# Patient Record
Sex: MALE | Race: WHITE | NOT HISPANIC OR LATINO | Employment: FULL TIME | URBAN - METROPOLITAN AREA
[De-identification: names, ages, dates, MRNs, and addresses within clinical notes are randomized per-mention and may not be internally consistent; named-entity substitution may affect disease eponyms.]

---

## 2022-06-23 ENCOUNTER — VIRTUAL VISIT (OUTPATIENT)
Dept: FAMILY MEDICINE | Facility: CLINIC | Age: 57
End: 2022-06-23
Payer: COMMERCIAL

## 2022-06-23 ENCOUNTER — NURSE TRIAGE (OUTPATIENT)
Dept: NURSING | Facility: CLINIC | Age: 57
End: 2022-06-23

## 2022-06-23 ENCOUNTER — LAB (OUTPATIENT)
Dept: LAB | Facility: CLINIC | Age: 57
End: 2022-06-23

## 2022-06-23 DIAGNOSIS — R50.9 FEVER, UNSPECIFIED FEVER CAUSE: Primary | ICD-10-CM

## 2022-06-23 DIAGNOSIS — R50.9 FEVER, UNSPECIFIED FEVER CAUSE: ICD-10-CM

## 2022-06-23 DIAGNOSIS — R05.9 COUGH: ICD-10-CM

## 2022-06-23 DIAGNOSIS — R52 BODY ACHES: ICD-10-CM
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
## 2022-06-23 LAB — B BURGDOR IGG+IGM SER QL: 0.05

## 2022-06-23 PROCEDURE — 86618 LYME DISEASE ANTIBODY: CPT

## 2022-06-23 PROCEDURE — 99203 OFFICE O/P NEW LOW 30 MIN: CPT | Mod: 95 | Performed by: PHYSICIAN ASSISTANT

## 2022-06-23 PROCEDURE — 36415 COLL VENOUS BLD VENIPUNCTURE: CPT

## 2022-06-23 ASSESSMENT — ENCOUNTER SYMPTOMS
HEADACHES: 1
FEVER: 1

## 2022-06-23 NOTE — PROGRESS NOTES
Luis is a 57 year old who is being evaluated via a billable telephone visit.      What phone number would you like to be contacted at? [phone]  How would you like to obtain your AVS? Mail a copy    Assessment & Plan     Fever, unspecified fever cause  May use over-the-counter meds to treat his fever  - Lyme Disease Total Abs Bld with Reflex to Confirm CLIA; Future  - Symptomatic; Yes; 6/21/2022 COVID-19 Virus (Coronavirus) by PCR Nose; Future    Cough  Should have face-to-face visit if rapidly progressing  - Symptomatic; Yes; 6/21/2022 COVID-19 Virus (Coronavirus) by PCR Nose; Future    Body aches  Await results, may use over-the-counter meds as needed  - Lyme Disease Total Abs Bld with Reflex to Confirm CLIA; Future  - Symptomatic; Yes; 6/21/2022 COVID-19 Virus (Coronavirus) by PCR Nose; Future    Tick bite, unspecified site, initial encounter  For now we will test only for Lyme but may add Ehrlichia if symptoms are persisting.  If Lyme is negative could consider retesting again in 2 weeks just to be sure  - Lyme Disease Total Abs Bld with Reflex to Confirm CLIA; Future      No follow-ups on file.    Marleni Jackson PA-C  [facility] ECHO Smith is a 57 year old, presenting for the following health issues:  Fever (Headache//) and Headache (cough)      Fever  Associated symptoms include a fever and headaches.   Headache   Associated symptoms include a fever.        Acute Illness  Acute illness concerns: headache, fever, cough  Onset/Duration: 2 days  Symptoms:  Fever: YES- he felt warm they do not have a working thermometer  Chills/Sweats: YES a little bit  Headache (location?): YES- frontal aspect of head, hurts more in head with cough  Sinus Pressure: no  Conjunctivitis:  no  Ear Pain: no  Rhinorrhea: no  Congestion: no denies congestion  Sore Throat: no  Cough: YES-slight, dry cough with shortness of breath a little bit with a deep breath it makes him cough, he has no  significant dyspnea or shortness of breath he is able to do normal activities, no chest pain.  No history of lung disease he is a lifetime non-smoker  Wheeze: no  Decreased Appetite: YES  Nausea: YES  Vomiting: no  Diarrhea: no  Dysuria/Freq.: no  Dysuria or Hematuria: no  Fatigue/Achiness: YES-- more achiness he feels more tired  He did have 2 tick bites and what he believes to be 2 wood tick bites 2 weeks ago.  He thinks he may have been on him for a few hours he has been looking for the bull's-eye rash but has not seen it.  Sick/Strep Exposure: no, he has had 1 COVID-vaccine, the Definigen in June 2021, he is planning to have a booster when he is feeling well  Therapies tried and outcome: None      Review of Systems   Constitutional: Positive for fever.   Neurological: Positive for headaches.            Objective           Vitals:  No vitals were obtained today due to virtual visit.    Physical Exam   healthy, alert and no distress  PSYCH: Alert and oriented times 3; coherent speech, normal   rate and volume, able to articulate logical thoughts, able   to abstract reason, no tangential thoughts, no hallucinations   or delusions  His affect is normal and pleasant  RESP: Occasional mild cough, no audible wheezing, able to talk in full sentences  Remainder of exam unable to be completed due to telephone visits    No results found for any visits on 06/23/22.            Phone call duration: 13 minutes            .  ..

## 2022-06-23 NOTE — TELEPHONE ENCOUNTER
Nurse Triage SBAR    Is this a 2nd Level Triage? NO    Situation: Pt calling to check on the status of his Lyme Disease test.     RN informed pt that the test is still in process.  Advised him to call back tomorrow mid-morning if he hasn't heard from the clinic before then.  He is not active on [employer].     Patient verbalized understanding and had no further questions.      Protocol Recommended Disposition: Info only    Miriam Moreno RN  [facility] Nurse Advisor              COVID 19 Nurse Triage Plan/Patient Instructions    Please be aware that novel coronavirus (COVID-19) may be circulating in the community. If you develop symptoms such as fever, cough, or SOB or if you have concerns about the presence of another infection including coronavirus (COVID-19), please contact your health care provider or visit [email or web address].     Disposition/Instructions    Home care recommended. Follow home care protocol based instructions.    Thank you for taking steps to prevent the spread of this virus.  o Limit your contact with others.  o Wear a simple mask to cover your cough.  o Wash your hands well and often.    Resources    [facility]: About COVID-19: [email or web address]    CDC: What to Do If You're Sick: www.cdc.gov/coronavirus/2019-ncov/about/steps-when-sick.html    CDC: Ending Home Isolation: www.cdc.gov/coronavirus/2019-ncov/hcp/disposition-in-home-patients.html     CDC: Caring for Someone: www.cdc.gov/coronavirus/2019-ncov/if-you-are-sick/care-for-someone.html     [facility]: Interim Guidance for Hospital Discharge to Home: www.health.state.[place]/diseases/coronavirus/hcp/hospdischarge.pdf    [facility] clinical trials (COVID-19 research studies): [email or web address]/[email or web address]     Below are the COVID-19 hotlines at the [facility] ([facility]). Interpreters are available.   o For health questions: Call 674-605-2733 or 1-364.698.4962 (7  a.m. to 7 p.m.)  o For questions about schools and childcare: Call 923-919-8350 or 1-631.226.9389 (7 a.m. to 7 p.m.)           Reason for Disposition    Caller requesting lab results    Additional Information    Negative: ED call to PCP    Negative: Physician call to PCP    Negative: Call about patient who is currently hospitalized    Negative: Lab or radiology calling with CRITICAL test results    Negative: [1] Prescription not at pharmacy AND [2] was prescribed today by PCP    Negative: [1] Follow-up call from patient regarding patient's clinical status AND [2] information urgent    Negative: [1] Caller requests to speak ONLY to PCP AND [2] urgent question    Negative: [1] Caller requests to speak to PCP now AND [2] won't tell us reason for call  (Exception: if 10 pm to 6 am, caller must first discuss reason for the call)    Negative: Notification of hospital admission    Negative: Notification of death    Protocols used: PCP CALL - NO TRIAGE-A-

## 2022-06-24 ENCOUNTER — TELEPHONE (OUTPATIENT)
Dept: FAMILY MEDICINE | Facility: OTHER | Age: 57
End: 2022-06-24

## 2022-06-24 ENCOUNTER — NURSE TRIAGE (OUTPATIENT)
Dept: NURSING | Facility: CLINIC | Age: 57
End: 2022-06-24

## 2022-06-24 NOTE — TELEPHONE ENCOUNTER
----- Message from Marleni Jackson PA-C sent at 6/24/2022 10:15 AM CDT -----  Please call pt- his lyme screen is negative.  Have him contact me next week with an update  Marleni Jackson PA-C

## 2022-06-24 NOTE — TELEPHONE ENCOUNTER
Patient is complaining of a severe headache, rates pain 8/10  Ibuprofen with some relief, but headache returns  Patient requesting lab results for lyme disease which was non reactive  Triage guidelines recommend to see today or tomorrow in office  Patient will go to Urgent Care for evaluation  Caller verbalized and understands directives  COVID 19 Nurse Triage Plan/Patient Instructions    Please be aware that novel coronavirus (COVID-19) may be circulating in the community. If you develop symptoms such as fever, cough, or SOB or if you have concerns about the presence of another infection including coronavirus (COVID-19), please contact your health care provider or visit [email or web address].     Disposition/Instructions    In-Person Visit with provider recommended. Reference Visit Selection Guide.    Thank you for taking steps to prevent the spread of this virus.  o Limit your contact with others.  o Wear a simple mask to cover your cough.  o Wash your hands well and often.    Resources    [facility]: About COVID-19: [email or web address]    CDC: What to Do If You're Sick: www.cdc.gov/coronavirus/2019-ncov/about/steps-when-sick.html    CDC: Ending Home Isolation: www.cdc.gov/coronavirus/2019-ncov/hcp/disposition-in-home-patients.html     CDC: Caring for Someone: www.cdc.gov/coronavirus/2019-ncov/if-you-are-sick/care-for-someone.html     [facility]: Interim Guidance for Hospital Discharge to Home: www.health.[facility].mn.[place]/diseases/coronavirus/hcp/hospdischarge.pdf    [facility] clinical trials (COVID-19 research studies): [email or web address]/umn-[email or web address]-trials     Below are the COVID-19 hotlines at the [place] [facility] ([facility]). Interpreters are available.   o For health questions: Call 878-706-9695 or 1-435.394.5696 (7 a.m. to 7 p.m.)  o For questions about schools and childcare: Call 780-293-0514 or 1-960.454.5564 (7 a.m. to 7 p.m.)                     Reason for  Disposition    New headache and age > 50    Additional Information    Negative: Difficult to awaken or acting confused (e.g., disoriented, slurred speech)    Negative: Weakness of the face, arm or leg on one side of the body and new onset    Negative: Numbness of the face, arm or leg on one side of the body and new onset    Negative: Loss of speech or garbled speech and new onset    Negative: Passed out (i.e., fainted, collapsed and was not responding)    Negative: Sounds like a life-threatening emergency to the triager    Negative: Followed a head injury within last 3 days    Negative: Traumatic Brain Injury (TBI) is suspected    Negative: Sinus pain of forehead and yellow or green nasal discharge    Negative: Pregnant    Negative: Unable to walk without falling    Negative: Stiff neck (can't touch chin to chest)    Negative: Possibility of carbon monoxide exposure    Negative: SEVERE headache, states 'worst headache' of life    Negative: SEVERE headache, sudden-onset (i.e., reaching maximum intensity within seconds to 1 hour)    Negative: Severe pain in one eye    Negative: Loss of vision or double vision (Exception: same as prior migraines)    Negative: Patient sounds very sick or weak to the triager    Negative: Fever > 103 F (39.4 C)    Negative: Fever > 100.0 F (37.8 C) and has diabetes mellitus or a weak immune system (e.g., HIV positive, cancer chemotherapy, organ transplant, splenectomy, chronic steroids)    Negative: SEVERE headache (e.g., excruciating) and has had severe headaches before    Negative: SEVERE headache and not relieved by pain meds    Negative: SEVERE headache and vomiting    Negative: SEVERE headache and fever    Negative: New headache and weak immune system (e.g., HIV positive, cancer chemo, splenectomy, organ transplant, chronic steroids)    Negative: Fever present > 3 days (72 hours)    Negative: Patient wants to be seen    Negative: Unexplained headache that is present > 24  hours    Protocols used: HEADACHE-A-[place]

## 2022-06-26 ENCOUNTER — OFFICE VISIT (OUTPATIENT)
Dept: URGENT CARE | Facility: URGENT CARE | Age: 57
End: 2022-06-26
Payer: COMMERCIAL

## 2022-06-26 VITALS
WEIGHT: 205.2 LBS | HEART RATE: 85 BPM | BODY MASS INDEX: 30.97 KG/M2 | SYSTOLIC BLOOD PRESSURE: 142 MMHG | DIASTOLIC BLOOD PRESSURE: 88 MMHG | OXYGEN SATURATION: 92 % | TEMPERATURE: 97.9 F

## 2022-06-26 DIAGNOSIS — U07.1 PNEUMONIA DUE TO 2019 NOVEL CORONAVIRUS: Primary | ICD-10-CM

## 2022-06-26 DIAGNOSIS — R53.83 FATIGUE, UNSPECIFIED TYPE: ICD-10-CM

## 2022-06-26 DIAGNOSIS — J12.82 PNEUMONIA DUE TO 2019 NOVEL CORONAVIRUS: Primary | ICD-10-CM

## 2022-06-26 LAB
ERYTHROCYTE [DISTWIDTH] IN BLOOD BY AUTOMATED COUNT: 12.6 % (ref 10–15)
HCT VFR BLD AUTO: 41.5 % (ref 40–53)
HGB BLD-MCNC: 14.4 G/DL (ref 13.3–17.7)
MCH RBC QN AUTO: 31.2 PG (ref 26.5–33)
MCHC RBC AUTO-ENTMCNC: 34.7 G/DL (ref 31.5–36.5)
MCV RBC AUTO: 90 FL (ref 78–100)
PLATELET # BLD AUTO: 199 10E3/UL (ref 150–450)
RBC # BLD AUTO: 4.61 10E6/UL (ref 4.4–5.9)
WBC # BLD AUTO: 14.8 10E3/UL (ref 4–11)

## 2022-06-26 PROCEDURE — U0005 INFEC AGEN DETEC AMPLI PROBE: HCPCS | Performed by: FAMILY MEDICINE

## 2022-06-26 PROCEDURE — 36415 COLL VENOUS BLD VENIPUNCTURE: CPT | Performed by: FAMILY MEDICINE

## 2022-06-26 PROCEDURE — 99214 OFFICE O/P EST MOD 30 MIN: CPT | Performed by: FAMILY MEDICINE

## 2022-06-26 PROCEDURE — 85027 COMPLETE CBC AUTOMATED: CPT | Performed by: FAMILY MEDICINE

## 2022-06-26 PROCEDURE — U0003 INFECTIOUS AGENT DETECTION BY NUCLEIC ACID (DNA OR RNA); SEVERE ACUTE RESPIRATORY SYNDROME CORONAVIRUS 2 (SARS-COV-2) (CORONAVIRUS DISEASE [COVID-19]), AMPLIFIED PROBE TECHNIQUE, MAKING USE OF HIGH THROUGHPUT TECHNOLOGIES AS DESCRIBED BY CMS-2020-01-R: HCPCS | Performed by: FAMILY MEDICINE

## 2022-06-26 NOTE — PROGRESS NOTES
Assessment & Plan     Pneumonia due to 2019 novel coronavirus  57-year-old male presented with cough, congestion, headache, fatigue and shortness of breath symptoms for about 6 days, had tick bites 2 weeks ago, symptoms already improving.  Chest x-ray findings reviewed independently, consistent with bilateral lower lobe infiltrates, CBC showed leukocytosis.  Symptoms likely secondary to COVID-19 infection.  COVID-19 PCR test ordered.  Treatment options reviewed.  Shared decision made to continue conservative treatment.  Recommended well hydration, warm fluids, over-the-counter analgesia/antitussive.  Complications explained in detail including but not limited to superimposed bacterial infection, respiratory distress syndrome and PE/DVT.  Suggested to follow-up with PCP in 3 to 5 days or earlier if needed.  All questions answered.    Fatigue, unspecified type  - CBC with platelets; Future  - XR Chest 2 Views; Future  - Symptomatic; Unknown COVID-19 Virus (Coronavirus) by PCR Nose  - CBC with platelets      David Hansen MD  [facility]    Jennie Smith is a 57 year old accompanied by his spouse., presenting for the following health issues:  Urgent Care (Talked to someone on Friday and did some labs-normal results but Still Dull headache and SOB)      HPI     Concern -   Onset: 6 days    Description: fatigue, headache, cough and some sob  Intensity: moderate  Progression of Symptoms:  improving  Accompanying Signs & Symptoms: no fever, chills, chest pain, palpitation or other relevant systemic symptoms  Previous history of similar problem:   Therapies tried and outcome: ibuprofen, tylenol   Home Covid 19 test was negative. Had tick bites 2 weeks ago      Review of Systems   Constitutional, HEENT, cardiovascular, pulmonary, GI, , musculoskeletal, neuro, skin, endocrine and psych systems are negative, except as otherwise noted.      Objective    BP (!) 142/88 (BP Location:  Left arm, Patient Position: Sitting, Cuff Size: Adult Large)   Pulse 85   Temp 97.9  F (36.6  C) (Axillary)   Wt 93.1 kg (205 lb 3.2 oz)   SpO2 92%   BMI 30.97 kg/m    Body mass index is 30.97 kg/m .  Physical Exam   GENERAL: alert and no distress  EYES: Eyes grossly normal to inspection, PERRL and conjunctivae and sclerae normal  HENT: normal cephalic/atraumatic, ear canals and TM's normal, nose and mouth without ulcers or lesions, oropharynx clear and oral mucous membranes moist  NECK: no adenopathy, no asymmetry, masses, or scars and thyroid normal to palpation  RESP: Few bibasilar crackles, no wheeze auscultated, not dyspneic  CV: regular rates and rhythm, normal S1 S2, no S3 or S4, no murmur, click or rub, peripheral pulses strong and no peripheral edema  MS: no gross musculoskeletal defects noted, no edema  SKIN: no suspicious lesions or rashes  NEURO: Normal strength and tone, mentation intact and speech normal  PSYCH: mentation appears normal, affect normal/bright    Results for orders placed or performed in visit on 06/26/22   XR Chest 2 Views     Status: None    Narrative    EXAM: XR CHEST 2 VW  LOCATION: [facility]  DATE/TIME: 6/26/2022 11:50 AM    INDICATION:  Fatigue, unspecified type  COMPARISON: None      Impression    IMPRESSION: There some faint interstitial infiltrates in both lung bases. Nonspecific but concerning for possible mild pneumonia. COVID pneumonia could have this appearance.   Results for orders placed or performed in visit on 06/26/22   CBC with platelets     Status: Abnormal   Result Value Ref Range    WBC Count 14.8 (H) 4.0 - 11.0 10e3/uL    RBC Count 4.61 4.40 - 5.90 10e6/uL    Hemoglobin 14.4 13.3 - 17.7 g/dL    Hematocrit 41.5 40.0 - 53.0 %    MCV 90 78 - 100 fL    MCH 31.2 26.5 - 33.0 pg    MCHC 34.7 31.5 - 36.5 g/dL    RDW 12.6 10.0 - 15.0 %    Platelet Count 199 150 - 450 10e3/uL                 .  ..

## 2022-06-27 DIAGNOSIS — J22 LOWER RESPIRATORY INFECTION: Primary | ICD-10-CM

## 2022-06-27 LAB — SARS-COV-2 RNA RESP QL NAA+PROBE: NEGATIVE

## 2022-06-27 RX ORDER — AZITHROMYCIN 250 MG/1
TABLET, FILM COATED ORAL
Qty: 6 TABLET | Refills: 0 | Status: SHIPPED | OUTPATIENT
Start: 2022-06-27 | End: 2022-07-02

## 2022-06-27 NOTE — PROGRESS NOTES
COVID-19 PCR test came back negative.  Treatment option discussed.  Augmentin and azithromycin prescribed to cover possible superimposed bacterial lower respiratory tract infection.  Recommended to schedule appointment with PCP later this week.  All questions answered.      Dr Hansen

## 2022-06-27 NOTE — TELEPHONE ENCOUNTER
Attempted to reach the patient with the following information.  Left message for patient to return call to clinic.     Sintia MONTES DE OCA CMA

## 2022-06-29 NOTE — TELEPHONE ENCOUNTER
Spoke with patient about lymes result    Patient was seen in urgent care and states he is improving, FYI for provider

## 2022-06-30 ENCOUNTER — NURSE TRIAGE (OUTPATIENT)
Dept: FAMILY MEDICINE | Facility: CLINIC | Age: 57
End: 2022-06-30

## 2022-06-30 ENCOUNTER — OFFICE VISIT (OUTPATIENT)
Dept: URGENT CARE | Facility: URGENT CARE | Age: 57
End: 2022-06-30
Payer: COMMERCIAL

## 2022-06-30 VITALS
WEIGHT: 199.1 LBS | BODY MASS INDEX: 30.05 KG/M2 | HEART RATE: 111 BPM | SYSTOLIC BLOOD PRESSURE: 123 MMHG | TEMPERATURE: 99 F | OXYGEN SATURATION: 96 % | DIASTOLIC BLOOD PRESSURE: 85 MMHG

## 2022-06-30 DIAGNOSIS — R00.0 TACHYCARDIA: Primary | ICD-10-CM

## 2022-06-30 DIAGNOSIS — J18.9 PNEUMONIA DUE TO INFECTIOUS ORGANISM, UNSPECIFIED LATERALITY, UNSPECIFIED PART OF LUNG: ICD-10-CM

## 2022-06-30 PROCEDURE — 99213 OFFICE O/P EST LOW 20 MIN: CPT | Performed by: PHYSICIAN ASSISTANT

## 2022-06-30 ASSESSMENT — ENCOUNTER SYMPTOMS
NAUSEA: 0
WHEEZING: 0
ABDOMINAL PAIN: 0
COUGH: 0
HEADACHES: 0
MUSCULOSKELETAL NEGATIVE: 1
FEVER: 0
FREQUENCY: 0
DIARRHEA: 0
CONSTITUTIONAL NEGATIVE: 1
DYSURIA: 0
NEUROLOGICAL NEGATIVE: 1
RESPIRATORY NEGATIVE: 1
GASTROINTESTINAL NEGATIVE: 1
MYALGIAS: 0
ALLERGIC/IMMUNOLOGIC NEGATIVE: 1
HEMATURIA: 0
CHILLS: 0
CHEST TIGHTNESS: 0
PALPITATIONS: 0
VOMITING: 0
CARDIOVASCULAR NEGATIVE: 1
SORE THROAT: 0
SHORTNESS OF BREATH: 0

## 2022-06-30 NOTE — TELEPHONE ENCOUNTER
S-(situation): Pt called due to elevated Pulse. Was recently dx with Pneumonia and taking antibiotics since 6/26/22. Pt stated he feels like he is getting better, just took his pulse on oximeter and noted pulse of 102 bpm so he called.     B-(background): Pt has no significant medical dx. Recently dx with pneumonia on 6/26/22.     A-(assessment): Pt denies Chest pain, SOB, Dizziness,and weakness. Pt reports that he just feels so tired and takes his oximetry and pulse. Pt stated he cant feel his heart beat and denies nay palpitations. Pt stated he feels like he'd like to get better faster.     R-(recommendations): Pt was educated on if weakness, chest pain, trouble breathing be seen in ER. Pt was advised to follow discharge instructions from visit on 6/26/22, Pt agreed and expressed understanding.     Additional Information    Negative: Passed out (i.e., fainted, collapsed and was not responding)    Negative: Shock suspected (e.g., cold/pale/clammy skin, too weak to stand, low BP, rapid pulse)    Negative: Difficult to awaken or acting confused (e.g., disoriented, slurred speech)    Negative: Visible sweat on face or sweat dripping down face    Negative: Unable to walk, or can only walk with assistance (e.g., requires support)    Negative: Received SHOCK from implantable cardiac defibrillator and has persisting symptoms (i.e., palpitations, lightheadedness)    Negative: Dizziness, lightheadedness, or weakness and heart beating very rapidly (e.g., > 140 / minute)    Negative: Dizziness, lightheadedness, or weakness and heart beating very slowly (e.g., < 50 / minute)    Negative: Sounds like a life-threatening emergency to the triager    Negative: Chest pain    Negative: Difficulty breathing    Negative: Dizziness, lightheadedness, or weakness    Negative: Heart beating very rapidly (e.g., > 140 / minute) and present now (Exception: during exercise)    Negative: Heart beating very slowly (e.g., < 50 / minute)  "(Exception: athlete)    Negative: New or worsened shortness of breath with activity (dyspnea on exertion)    Negative: Patient sounds very sick or weak to the triager    Negative: Wearing a 'Holter monitor' or 'cardiac event monitor'    Negative: Received SHOCK from implantable cardiac defibrillator (and now feels well)    Negative: Heart beating very rapidly (e.g., > 140 / minute) and not present now (Exception: during exercise)    Negative: Skipped or extra beat(s) and increases with exercise or exertion    Negative: Skipped or extra beat(s) and occurs 4 or more times per minute    Negative: History of heart disease (i.e., heart attack, bypass surgery, angina, angioplasty)    Negative: Age > 60 years    Negative: Taking water pill (i.e., diuretic) or heart medication (e.g., digoxin)    Negative: Patient wants to be seen    Negative: History of hyperthyroidism or taking thyroid medication    Negative: Known or suspected substance abuse (e.g., cocaine, alcohol abuse)    Negative: ADHD and taking stimulant medication    Negative: Palpitations and no improvement after following Care Advice    Negative: Problems with anxiety or stress    Negative: Palpitations are a chronic symptom (recurrent or ongoing AND present > 4 weeks)    Negative: Palpitations    Negative: Skipped or extra beat(s) and occurs < 4 times / minute    Answer Assessment - Initial Assessment Questions  1. DESCRIPTION: \"Please describe your heart rate or heart beat that you are having\" (e.g., fast/slow, regular/irregular, skipped or extra beats, \"palpitations\")      Pt stated that his HR is 100 bpm, and stated Pt stated his heart rate goes between  bpm     2. ONSET: \"When did it start?\" (Minutes, hours or days)       Pt stated ift felt good this morning, and minutes.     3. DURATION: \"How long does it last\" (e.g., seconds, minutes, hours)      A few minutes and then clears up.     4. PATTERN \"Does it come and go, or has it been constant since it " "started?\"  \"Does it get worse with exertion?\"   \"Are you feeling it now?\"      Pt stated he just has been laying around due to feeling tired.     5. TAP: \"Using your hand, can you tap out what you are feeling on a chair or table in front of you, so that I can hear?\" (Note: not all patients can do this)        Pt  Stated he doesn't feel it and seems regular  6. HEART RATE: \"Can you tell me your heart rate?\" \"How many beats in 15 seconds?\"  (Note: not all patients can do this)        102  7. RECURRENT SYMPTOM: \"Have you ever had this before?\" If so, ask: \"When was the last time?\" and \"What happened that time?\"   Denies     8. CAUSE: \"What do you think is causing the palpitations?\"      Pt stated feeling sick  9. CARDIAC HISTORY: \"Do you have any history of heart disease?\" (e.g., heart attack, angina, bypass surgery, angioplasty, arrhythmia)       Denies   10. OTHER SYMPTOMS: \"Do you have any other symptoms?\" (e.g., dizziness, chest pain, sweating, difficulty breathing)        Denies   11. PREGNANCY: \"Is there any chance you are pregnant?\" \"When was your last menstrual period?\"        NA    Protocols used: HEART RATE AND HEARTBEAT [id number]    "

## 2022-06-30 NOTE — PROGRESS NOTES
Chief Complaint:     Chief Complaint   Patient presents with     RECHECK     Pt was seen in urgent care on 06/26, pt states he is feeling better, he checked his pulse today it was at 102 bpm       No results found for any visits on 06/30/22.    Medical Decision Making:    Vital signs reviewed by Isreal Moreno PA-C  /85 (BP Location: Right arm, Patient Position: Sitting, Cuff Size: Adult Large)   Pulse 111   Temp 99  F (37.2  C) (Oral)   Wt 90.3 kg (199 lb 1.6 oz)   SpO2 96%   BMI 30.05 kg/m      Differential Diagnosis:  { Differential Choices:536232}        ASSESSMENT    No diagnosis found.    PLAN    Patient is in no acute distress.    Temp is *** in clinic today, lung sounds were clear, and O2 sats at ***% on RA.    RST was negative.  We will call with PCR results only if positive.  COVID swab collected in clinic today.  Rest, Push fluids, vaporizer, elevation of head of bed.  Ibuprofen and or Tylenol for any fever or body aches.  Over the counter cough suppressant- PRN- as discussed.   If symptoms worsen, recheck immediately otherwise follow up with your PCP in 1 week if symptoms are not improving.  Worrisome symptoms discussed with instructions to go to the ED.  Patient verbalized understanding and agreed with this plan.    Labs:    No results found for any visits on 06/30/22.     Vital signs reviewed by Isreal Moreno PA-C  /85 (BP Location: Right arm, Patient Position: Sitting, Cuff Size: Adult Large)   Pulse 111   Temp 99  F (37.2  C) (Oral)   Wt 90.3 kg (199 lb 1.6 oz)   SpO2 96%   BMI 30.05 kg/m      Current Meds      Current Outpatient Medications:      amoxicillin-clavulanate (AUGMENTIN) 875-125 MG tablet, Take 1 tablet by mouth 2 times daily for 10 days, Disp: 20 tablet, Rfl: 0     azithromycin (ZITHROMAX) 250 MG tablet, Take 2 tablets (500 mg) by mouth daily for 1 day, THEN 1 tablet (250 mg) daily for 4 days., Disp: 6 tablet, Rfl: 0     IBUPROFEN PO, , Disp: , Rfl:      NO  "ACTIVE MEDICATIONS, , Disp: , Rfl:       Respiratory History    {RESPIRATORY HISTORY:412}      SUBJECTIVE    HPI: Luis Bran is an 57 year old male who presents with {uri complaints:302374}.  Symptoms began {NUMBERS(MULTIPLE):865629}  {MONTH/WEEKS:576301::\"weeks\"} ago and has {CLINICAL COURSE - HISTORY:17}.  There is no {COUGH:673973}.  Patient is eating and drinking well.  No fever, nausea, vomiting, or diarrhea.    Patient denies any recent travel or exposure to known COVID positive tested individual.      ROS:     Review of Systems   Constitutional: Negative.  Negative for chills and fever.   HENT: Negative.  Negative for sore throat.    Respiratory: Negative.  Negative for cough, chest tightness, shortness of breath and wheezing.    Cardiovascular: Negative.  Negative for chest pain and palpitations.   Gastrointestinal: Negative.  Negative for abdominal pain, diarrhea, nausea and vomiting.   Genitourinary: Negative for dysuria, frequency, hematuria and urgency.   Musculoskeletal: Negative.  Negative for myalgias.   Skin: Negative for rash.   Allergic/Immunologic: Negative.  Negative for immunocompromised state.   Neurological: Negative.  Negative for headaches.         Family History   No family history on file.     Problem history  Patient Active Problem List   Diagnosis     Laceration of thumb     CARDIOVASCULAR SCREENING; LDL GOAL LESS THAN 160        Allergies  No Known Allergies     Social History  Social History     Socioeconomic History     Marital status:      Spouse name: Not on file     Number of children: Not on file     Years of education: Not on file     Highest education level: Not on file   Occupational History     Not on file   Tobacco Use     Smoking status: Never Smoker     Smokeless tobacco: Never Used   Substance and Sexual Activity     Alcohol use: Yes     Comment: 1-2 beers per month     Drug use: No     Sexual activity: Yes     Partners: Female     Birth control/protection: " Condom   Other Topics Concern     Parent/sibling w/ CABG, MI or angioplasty before 65F 55M? No   Social History Narrative     Not on file     Social Determinants of Health     Financial Resource Strain: Not on file   Food Insecurity: Not on file   Transportation Needs: Not on file   Physical Activity: Not on file   Stress: Not on file   Social Connections: Not on file   Intimate Partner Violence: Not on file   Housing Stability: Not on file        OBJECTIVE     Vital signs reviewed by Isreal Moreno PA-C  /85 (BP Location: Right arm, Patient Position: Sitting, Cuff Size: Adult Large)   Pulse 111   Temp 99  F (37.2  C) (Oral)   Wt 90.3 kg (199 lb 1.6 oz)   SpO2 96%   BMI 30.05 kg/m       Physical Exam  Vitals reviewed.   Constitutional:       General: He is not in acute distress.     Appearance: He is well-developed. He is not ill-appearing, toxic-appearing or diaphoretic.   HENT:      Head: Normocephalic and atraumatic.      Right Ear: Hearing, tympanic membrane, ear canal and external ear normal. No drainage, swelling or tenderness. Tympanic membrane is not perforated, erythematous, retracted or bulging.      Left Ear: Hearing, tympanic membrane, ear canal and external ear normal. No drainage, swelling or tenderness. Tympanic membrane is not perforated, erythematous, retracted or bulging.      Nose: Congestion and rhinorrhea present. No nasal tenderness or mucosal edema.      Right Turbinates: Not enlarged or swollen.      Left Turbinates: Not enlarged or swollen.      Right Sinus: No maxillary sinus tenderness or frontal sinus tenderness.      Left Sinus: No maxillary sinus tenderness or frontal sinus tenderness.      Mouth/Throat:      Pharynx: Posterior oropharyngeal erythema present. No pharyngeal swelling, oropharyngeal exudate or uvula swelling.      Tonsils: No tonsillar exudate. 0 on the right. 0 on the left.   Eyes:      General: Lids are normal.         Right eye: No discharge.         Left  eye: No discharge.      Conjunctiva/sclera: Conjunctivae normal.      Right eye: Right conjunctiva is not injected. No exudate.     Left eye: Left conjunctiva is not injected. No exudate.     Pupils: Pupils are equal, round, and reactive to light.   Cardiovascular:      Rate and Rhythm: Normal rate and regular rhythm.      Heart sounds: Normal heart sounds. No murmur heard.    No friction rub. No gallop.   Pulmonary:      Effort: Pulmonary effort is normal. No accessory muscle usage, respiratory distress or retractions.      Breath sounds: Normal breath sounds and air entry. No stridor, decreased air movement or transmitted upper airway sounds. No decreased breath sounds, wheezing, rhonchi or rales.   Chest:      Chest wall: No tenderness.   Abdominal:      General: Bowel sounds are normal. There is no distension.      Palpations: Abdomen is soft. Abdomen is not rigid. There is no mass.      Tenderness: There is no abdominal tenderness. There is no guarding or rebound.   Musculoskeletal:         General: Normal range of motion.      Cervical back: Normal range of motion and neck supple.   Lymphadenopathy:      Head:      Right side of head: No submental, submandibular, tonsillar, preauricular or posterior auricular adenopathy.      Left side of head: No submental, submandibular, tonsillar, preauricular or posterior auricular adenopathy.      Cervical:      Right cervical: No superficial or posterior cervical adenopathy.     Left cervical: No superficial or posterior cervical adenopathy.   Skin:     General: Skin is warm.      Capillary Refill: Capillary refill takes less than 2 seconds.   Neurological:      Mental Status: He is alert and oriented to person, place, and time.      Cranial Nerves: No cranial nerve deficit.      Sensory: No sensory deficit.      Motor: No abnormal muscle tone.      Coordination: Coordination normal.      Deep Tendon Reflexes: Reflexes normal.   Psychiatric:         Behavior: Behavior  normal. Behavior is cooperative.         Thought Content: Thought content normal.         Judgment: Judgment normal.           Isreal Moreno PA-C  6/30/2022, 1:37 PM

## 2022-06-30 NOTE — TELEPHONE ENCOUNTER
Reason for Call:  Pulse rate increases     Detailed comments: Pt said his pulse rate increases up and down during the day. Pt said he feels tired with the increase of Pulse up and down.   Pt was into see Dr. Hansen 6/26/ at the [facility].  Pt was told to call Dr. Hansen if still has issues.  Please     Phone Number Patient can be reached at: Cell number on file:    Telephone Information:   Mobile [phone]       Best Time: Any Time      Can we leave a detailed message on this number? YES    Call taken on 6/30/2022 at 9:51 AM by Rae Hamilton

## 2022-06-30 NOTE — PROGRESS NOTES
Chief Complaint:    Chief Complaint   Patient presents with     RECHECK     Pt was seen in urgent care on 06/26, pt states he is feeling better, he checked his pulse today it was at 102 bpm     Medical Decision Making:    Vital signs reviewed by Isreal Moreno PA-C  /85 (BP Location: Right arm, Patient Position: Sitting, Cuff Size: Adult Large)   Pulse 111   Temp 99  F (37.2  C) (Oral)   Wt 90.3 kg (199 lb 1.6 oz)   SpO2 96%   BMI 30.05 kg/m      Differential Diagnosis:  Pneumonia, dehydration, anemia     ASSESSMENT:     1. Tachycardia    2. Pneumonia due to infectious organism, unspecified laterality, unspecified part of lung        PLAN:     Patient is in no acute distress.  He is tachycardic with pulse of 111, and has temp of 99.0    Patient states that his symptoms have significantly improved.    Patient instructed to follow up with PCP in 1 week if symptoms are not improving.  Sooner if symptoms worsen.  Worrisome symptoms discussed with instructions to go to the ED.  Patient verbalized understanding and agreed with this plan.    Labs:     No results found for any visits on 06/30/22.    Current Meds:    Current Outpatient Medications:      amoxicillin-clavulanate (AUGMENTIN) 875-125 MG tablet, Take 1 tablet by mouth 2 times daily for 10 days, Disp: 20 tablet, Rfl: 0     azithromycin (ZITHROMAX) 250 MG tablet, Take 2 tablets (500 mg) by mouth daily for 1 day, THEN 1 tablet (250 mg) daily for 4 days., Disp: 6 tablet, Rfl: 0     IBUPROFEN PO, , Disp: , Rfl:      NO ACTIVE MEDICATIONS, , Disp: , Rfl:     Allergies:  No Known Allergies    SUBJECTIVE    HPI: Luis Bran is an 57 year old male who presents for evaluation and treatment of ongoing tachycardia.  Patient was Dx with Pneumonia 4 days ago and started on antibiotics.  He has been taking his O2 sats and pulse at home and is concerned about his pulse rate.  He denies any chest pain, palpitations, SOB, dizziness, or headache.      ROS:       Review of Systems   Constitutional: Negative.  Negative for chills and fever.   HENT: Negative.  Negative for sore throat.    Respiratory: Negative.  Negative for cough, chest tightness, shortness of breath and wheezing.    Cardiovascular: Negative.  Negative for chest pain and palpitations.   Gastrointestinal: Negative.  Negative for abdominal pain, diarrhea, nausea and vomiting.   Genitourinary: Negative for dysuria, frequency, hematuria and urgency.   Musculoskeletal: Negative.  Negative for myalgias.   Skin: Negative for rash.   Allergic/Immunologic: Negative.  Negative for immunocompromised state.   Neurological: Negative.  Negative for headaches.        Family History   No family history on file.    Social History  Social History     Socioeconomic History     Marital status:      Spouse name: Not on file     Number of children: Not on file     Years of education: Not on file     Highest education level: Not on file   Occupational History     Not on file   Tobacco Use     Smoking status: Never Smoker     Smokeless tobacco: Never Used   Substance and Sexual Activity     Alcohol use: Yes     Comment: 1-2 beers per month     Drug use: No     Sexual activity: Yes     Partners: Female     Birth control/protection: Condom   Other Topics Concern     Parent/sibling w/ CABG, MI or angioplasty before 65F 55M? No   Social History Narrative     Not on file     Social Determinants of Health     Financial Resource Strain: Not on file   Food Insecurity: Not on file   Transportation Needs: Not on file   Physical Activity: Not on file   Stress: Not on file   Social Connections: Not on file   Intimate Partner Violence: Not on file   Housing Stability: Not on file        Surgical History:  No past surgical history on file.     Problem List:  Patient Active Problem List   Diagnosis     Laceration of thumb     CARDIOVASCULAR SCREENING; LDL GOAL LESS THAN 160           OBJECTIVE:     Vital signs noted and reviewed by  Isreal Moreno PA-C  /85 (BP Location: Right arm, Patient Position: Sitting, Cuff Size: Adult Large)   Pulse 111   Temp 99  F (37.2  C) (Oral)   Wt 90.3 kg (199 lb 1.6 oz)   SpO2 96%   BMI 30.05 kg/m       PEFR:    Physical Exam  Vitals and nursing note reviewed.   Constitutional:       General: He is not in acute distress.     Appearance: He is well-developed. He is not ill-appearing, toxic-appearing or diaphoretic.   HENT:      Head: Normocephalic and atraumatic.      Right Ear: Hearing, tympanic membrane, ear canal and external ear normal. Tympanic membrane is not perforated, erythematous, retracted or bulging.      Left Ear: Hearing, tympanic membrane, ear canal and external ear normal. Tympanic membrane is not perforated, erythematous, retracted or bulging.      Nose: Nose normal. No mucosal edema, congestion or rhinorrhea.      Mouth/Throat:      Pharynx: No oropharyngeal exudate or posterior oropharyngeal erythema.      Tonsils: No tonsillar exudate or tonsillar abscesses. 0 on the right. 0 on the left.   Eyes:      Pupils: Pupils are equal, round, and reactive to light.   Cardiovascular:      Rate and Rhythm: Normal rate and regular rhythm.      Heart sounds: Normal heart sounds, S1 normal and S2 normal. Heart sounds not distant. No murmur heard.    No friction rub. No gallop.   Pulmonary:      Effort: Pulmonary effort is normal. No respiratory distress.      Breath sounds: Normal breath sounds. No decreased breath sounds, wheezing, rhonchi or rales.   Abdominal:      General: Bowel sounds are normal. There is no distension.      Palpations: Abdomen is soft.      Tenderness: There is no abdominal tenderness.   Musculoskeletal:      Cervical back: Normal range of motion and neck supple.   Lymphadenopathy:      Cervical: No cervical adenopathy.   Skin:     General: Skin is warm and dry.      Findings: No rash.   Neurological:      Mental Status: He is alert.      Cranial Nerves: No cranial nerve  deficit.   Psychiatric:         Attention and Perception: He is attentive.         Speech: Speech normal.         Behavior: Behavior normal. Behavior is cooperative.         Thought Content: Thought content normal.         Judgment: Judgment normal.             Isreal Moreno PA-C  6/30/2022, 1:49 PM

## 2022-06-30 NOTE — TELEPHONE ENCOUNTER
Please recommend patient to go urgent care/same-day clinic today for reevaluation, close follow-up.    Dr Hansen

## 2022-07-02 ENCOUNTER — HEALTH MAINTENANCE LETTER (OUTPATIENT)
Age: 57
End: 2022-07-02

## 2023-04-23 ENCOUNTER — HEALTH MAINTENANCE LETTER (OUTPATIENT)
Age: 58
End: 2023-04-23

## 2023-07-15 ENCOUNTER — HEALTH MAINTENANCE LETTER (OUTPATIENT)
Age: 58
End: 2023-07-15

## 2024-09-07 ENCOUNTER — HEALTH MAINTENANCE LETTER (OUTPATIENT)
Age: 59
End: 2024-09-07